# Patient Record
Sex: MALE | Race: ASIAN | Employment: FULL TIME | URBAN - METROPOLITAN AREA
[De-identification: names, ages, dates, MRNs, and addresses within clinical notes are randomized per-mention and may not be internally consistent; named-entity substitution may affect disease eponyms.]

---

## 2017-08-15 ENCOUNTER — OFFICE VISIT (OUTPATIENT)
Dept: FAMILY MEDICINE | Facility: CLINIC | Age: 47
End: 2017-08-15
Payer: COMMERCIAL

## 2017-08-15 VITALS
RESPIRATION RATE: 20 BRPM | SYSTOLIC BLOOD PRESSURE: 130 MMHG | TEMPERATURE: 98 F | HEIGHT: 65 IN | BODY MASS INDEX: 32.34 KG/M2 | DIASTOLIC BLOOD PRESSURE: 88 MMHG | HEART RATE: 80 BPM | WEIGHT: 194.1 LBS

## 2017-08-15 DIAGNOSIS — I10 BENIGN ESSENTIAL HTN: ICD-10-CM

## 2017-08-15 DIAGNOSIS — R68.82 LOW LIBIDO: ICD-10-CM

## 2017-08-15 DIAGNOSIS — E78.5 HYPERLIPIDEMIA WITH TARGET LDL LESS THAN 100: ICD-10-CM

## 2017-08-15 DIAGNOSIS — E11.9 TYPE 2 DIABETES MELLITUS WITHOUT COMPLICATION, WITHOUT LONG-TERM CURRENT USE OF INSULIN (H): Primary | ICD-10-CM

## 2017-08-15 DIAGNOSIS — N52.9 VASCULOGENIC ERECTILE DYSFUNCTION, UNSPECIFIED VASCULOGENIC ERECTILE DYSFUNCTION TYPE: ICD-10-CM

## 2017-08-15 LAB
ALBUMIN SERPL-MCNC: 4.1 G/DL (ref 3.4–5)
ALP SERPL-CCNC: 90 U/L (ref 40–150)
ALT SERPL W P-5'-P-CCNC: 43 U/L (ref 0–70)
ANION GAP SERPL CALCULATED.3IONS-SCNC: 9 MMOL/L (ref 3–14)
AST SERPL W P-5'-P-CCNC: 24 U/L (ref 0–45)
BILIRUB SERPL-MCNC: 0.4 MG/DL (ref 0.2–1.3)
BUN SERPL-MCNC: 10 MG/DL (ref 7–30)
CALCIUM SERPL-MCNC: 8.9 MG/DL (ref 8.5–10.1)
CHLORIDE SERPL-SCNC: 103 MMOL/L (ref 94–109)
CHOLEST SERPL-MCNC: 95 MG/DL
CO2 SERPL-SCNC: 27 MMOL/L (ref 20–32)
CREAT SERPL-MCNC: 0.98 MG/DL (ref 0.66–1.25)
GFR SERPL CREATININE-BSD FRML MDRD: 82 ML/MIN/1.7M2
GLUCOSE SERPL-MCNC: 80 MG/DL (ref 70–99)
HBA1C MFR BLD: 6.3 % (ref 4.3–6)
HDLC SERPL-MCNC: 27 MG/DL
LDLC SERPL CALC-MCNC: 11 MG/DL
NONHDLC SERPL-MCNC: 68 MG/DL
POTASSIUM SERPL-SCNC: 4.3 MMOL/L (ref 3.4–5.3)
PROT SERPL-MCNC: 7.6 G/DL (ref 6.8–8.8)
SODIUM SERPL-SCNC: 139 MMOL/L (ref 133–144)
TRIGL SERPL-MCNC: 284 MG/DL
TSH SERPL DL<=0.005 MIU/L-ACNC: 1.95 MU/L (ref 0.4–4)

## 2017-08-15 PROCEDURE — 99396 PREV VISIT EST AGE 40-64: CPT | Performed by: FAMILY MEDICINE

## 2017-08-15 PROCEDURE — 36415 COLL VENOUS BLD VENIPUNCTURE: CPT | Performed by: FAMILY MEDICINE

## 2017-08-15 PROCEDURE — 99207 C FOOT EXAM  NO CHARGE: CPT | Mod: 25 | Performed by: FAMILY MEDICINE

## 2017-08-15 PROCEDURE — 84403 ASSAY OF TOTAL TESTOSTERONE: CPT | Performed by: FAMILY MEDICINE

## 2017-08-15 PROCEDURE — 82043 UR ALBUMIN QUANTITATIVE: CPT | Performed by: FAMILY MEDICINE

## 2017-08-15 PROCEDURE — 80053 COMPREHEN METABOLIC PANEL: CPT | Performed by: FAMILY MEDICINE

## 2017-08-15 PROCEDURE — 84270 ASSAY OF SEX HORMONE GLOBUL: CPT | Performed by: FAMILY MEDICINE

## 2017-08-15 PROCEDURE — 83036 HEMOGLOBIN GLYCOSYLATED A1C: CPT | Performed by: FAMILY MEDICINE

## 2017-08-15 PROCEDURE — 84443 ASSAY THYROID STIM HORMONE: CPT | Performed by: FAMILY MEDICINE

## 2017-08-15 PROCEDURE — 80061 LIPID PANEL: CPT | Performed by: FAMILY MEDICINE

## 2017-08-15 RX ORDER — SILDENAFIL 100 MG/1
50-100 TABLET, FILM COATED ORAL DAILY PRN
Qty: 12 TABLET | Refills: 11 | Status: SHIPPED | OUTPATIENT
Start: 2017-08-15

## 2017-08-15 NOTE — MR AVS SNAPSHOT
After Visit Summary   8/15/2017    Damien Disla    MRN: [id number]           Patient Information     Date Of Birth          1970        Visit Information        Provider Department      8/15/2017 1:00 PM Efren Fuller MD [facility]        Today's Diagnoses     Type 2 diabetes mellitus without complication, without long-term current use of insulin (H)    -  1    Hyperlipidemia with target LDL less than 100        Benign essential HTN        Low libido        Vasculogenic erectile dysfunction, unspecified vasculogenic erectile dysfunction type          Care Instructions      Preventive Health Recommendations  Male Ages 40 to 49    Yearly exam:             See your health care provider every year in order to  o   Review health changes.   o   Discuss preventive care.    o   Review your medicines if your doctor has prescribed any.    You should be tested each year for STDs (sexually transmitted diseases) if you re at risk.     Have a cholesterol test every 5 years.     Have a colonoscopy (test for colon cancer) if someone in your family has had colon cancer or polyps before age 50.     After age 45, have a diabetes test (fasting glucose). If you are at risk for diabetes, you should have this test every 3 years.      Talk with your health care provider about whether or not a prostate cancer screening test (PSA) is right for you.    Shots: Get a flu shot each year. Get a tetanus shot every 10 years.     Nutrition:    Eat at least 5 servings of fruits and vegetables daily.     Eat whole-grain bread, whole-wheat pasta and brown rice instead of white grains and rice.     Talk to your provider about Calcium and Vitamin D.     Lifestyle    Exercise for at least 150 minutes a week (30 minutes a day, 5 days a week). This will help you control your weight and prevent disease.     Limit alcohol to one drink per day.     No smoking.     Wear sunscreen to prevent skin cancer.     See your  "dentist every six months for an exam and cleaning.              Follow-ups after your visit        Who to contact     If you have questions or need follow up information about today's clinic visit or your schedule please contact [facility] directly at [phone].  Normal or non-critical lab and imaging results will be communicated to you by MyChart, letter or phone within 4 business days after the clinic has received the results. If you do not hear from us within 7 days, please contact the clinic through [employer] or phone. If you have a critical or abnormal lab result, we will notify you by phone as soon as possible.  Submit refill requests through [employer] or call your pharmacy and they will forward the refill request to us. Please allow 3 business days for your refill to be completed.          Additional Information About Your Visit        MyChart Information     [employer] gives you secure access to your electronic health record. If you see a primary care provider, you can also send messages to your care team and make appointments. If you have questions, please call your primary care clinic.  If you do not have a primary care provider, please call [phone] and they will assist you.        Care EveryWhere ID     This is your Care EveryWhere ID. This could be used by other organizations to access your [facility] medical records  [id number]        Your Vitals Were     Pulse Temperature Respirations Height BMI (Body Mass Index)       80 98  F (36.7  C) (Oral) 20 5' 5\" (1.651 m) 32.3 kg/m2        Blood Pressure from Last 3 Encounters:   08/15/17 130/88   03/14/16 118/72   12/11/15 122/72    Weight from Last 3 Encounters:   08/15/17 194 lb 1.6 oz (88 kg)   03/14/16 165 lb (74.8 kg)   12/11/15 172 lb (78 kg)              We Performed the Following     Albumin Random Urine Quantitative     Comprehensive metabolic panel     FOOT EXAM     Hemoglobin A1c     Lipid panel reflex to direct LDL     " Testosterone Free and Total     TSH with free T4 reflex          Today's Medication Changes          These changes are accurate as of: 8/15/17  2:02 PM.  If you have any questions, ask your nurse or doctor.               Start taking these medicines.        Dose/Directions    sildenafil 100 MG cap/tab   Commonly known as:  REVATIO/VIAGRA   Used for:  Vasculogenic erectile dysfunction, unspecified vasculogenic erectile dysfunction type   Started by:  Efren Fuller MD        Dose:   mg   Take 0.5-1 tablets ( mg) by mouth daily as needed for erectile dysfunction Take 30 min to 4 hours before intercourse.  Never use with nitroglycerin, terazosin or doxazosin.   Quantity:  12 tablet   Refills:  11            Where to get your medicines      Some of these will need a paper prescription and others can be bought over the counter.  Ask your nurse if you have questions.     Bring a paper prescription for each of these medications     sildenafil 100 MG cap/tab                Primary Care Provider Office Phone # Fax #    Efren Fuller [phone]       [address] [facility] [address]        Equal Access to Services     [facility]: Hadii aad ku hadasho Soomaali, waaxda luqadaha, qaybta kaalmada adeegyada, waxay manuel haysuri hill . So [facility] [phone].    ATENCIÓN: Si habla español, tiene a valencia disposición servicios gratuitos de asistencia lingüística. Llame al [phone].    We comply with applicable federal civil rights laws and [place] laws. We do not discriminate on the basis of race, color, national origin, age, disability sex, sexual orientation or gender identity.            Thank you!     Thank you for choosing [facility]  for your care. Our goal is always to provide you with excellent care. Hearing back from our patients is one way we can continue to improve our services. Please take a few minutes to complete the written  survey that you may receive in the mail after your visit with us. Thank you!             Your Updated Medication List - Protect others around you: Learn how to safely use, store and throw away your medicines at www.disposemymeds.org.          This list is accurate as of: 8/15/17  2:02 PM.  Always use your most recent med list.                   Brand Name Dispense Instructions for use Diagnosis    ASPIRIN PO      Take 81 mg by mouth        losartan 25 MG tablet    COZAAR    90 tablet    Take 1 tablet (25 mg) by mouth daily Order ref# [id number]    Essential hypertension       * metFORMIN 500 MG 24 hr tablet    GLUCOPHAGE-XR    90 tablet    Take 2 tablets (1,000 mg) by mouth daily (with dinner)    Diabetes mellitus type 2, uncomplicated (H)       * metFORMIN 500 MG tablet    GLUCOPHAGE    180 tablet    Take 1 tablet (500 mg) by mouth 2 times daily (with meals)    Diabetes mellitus type 2, uncomplicated (H)       sildenafil 100 MG cap/tab    REVATIO/VIAGRA    12 tablet    Take 0.5-1 tablets ( mg) by mouth daily as needed for erectile dysfunction Take 30 min to 4 hours before intercourse.  Never use with nitroglycerin, terazosin or doxazosin.    Vasculogenic erectile dysfunction, unspecified vasculogenic erectile dysfunction type       simvastatin 40 MG tablet    ZOCOR    90 tablet    Take 1 tablet (40 mg) by mouth At Bedtime Order ref# [id number]    Hyperlipidemia LDL goal <130       VITAMIN D3 PO      Take 1,000 Units by mouth daily        * Notice:  This list has 2 medication(s) that are the same as other medications prescribed for you. Read the directions carefully, and ask your doctor or other care provider to review them with you.

## 2017-08-15 NOTE — PROGRESS NOTES
SUBJECTIVE:   CC: Damien Disla is an 46 year old male who presents for preventative health visit.     Physical   Annual:     Getting at least 3 servings of Calcium per day::  Yes    Bi-annual eye exam::  Yes    Dental care twice a year::  NO    Sleep apnea or symptoms of sleep apnea::  None    Diet::  Low salt    Frequency of exercise::  None    Taking medications regularly::  Yes    Medication side effects::  None    Additional concerns today::  YES      Answers for HPI/ROS submitted by the patient on 8/15/2017   PHQ-2 Score: 0      Generally feeling well, no specific concerns.  Starting to try and have another child.  Having some Ed issues.  No significant morning erection.  Family life is complicated - mom is critically ill and living with them.  Moved away and back within the last year and half.   Damien admits he feels quite stressed.  Also feeling some pressure to have another child as he and his wife are aging.    Is not currently fasting.    Today's PHQ-2 Score: PHQ-2 ( 1999 Pfizer) 3/14/2016   Q1: Little interest or pleasure in doing things 0   Q2: Feeling down, depressed or hopeless 0   PHQ-2 Score 0       Social History   Substance Use Topics     Smoking status: Former Smoker     Packs/day: 1.00     Years: 18.00     Types: Cigarettes     Quit date: 1/1/2004     Smokeless tobacco: Never Used     Alcohol use No     The patient does not drink >3 drinks per day nor >7 drinks per week.    Last PSA: No results found for: PSA    Reviewed orders with patient. Reviewed health maintenance and updated orders accordingly - Yes      Reviewed and updated as needed this visit by clinical staff         Reviewed and updated as needed this visit by Provider              ROS:  C: NEGATIVE for fever, chills, change in weight  I: NEGATIVE for worrisome rashes, moles or lesions  E: NEGATIVE for vision changes or irritation  ENT: NEGATIVE for ear, mouth and throat problems  R: NEGATIVE for significant cough or SOB  CV: NEGATIVE  for chest pain, palpitations or peripheral edema  GI: NEGATIVE for nausea, abdominal pain, heartburn, or change in bowel habits   male: negative for dysuria, hematuria, decreased urinary stream, erectile dysfunction, urethral discharge  M: NEGATIVE for significant arthralgias or myalgia  N: NEGATIVE for weakness, dizziness or paresthesias  P: NEGATIVE for changes in mood or affect    OBJECTIVE:   There were no vitals taken for this visit.    EXAM:  GENERAL: healthy, alert and no distress  EYES: Eyes grossly normal to inspection, PERRL and conjunctivae and sclerae normal  HENT: ear canals and TM's normal, nose and mouth without ulcers or lesions  NECK: no adenopathy, no asymmetry, masses, or scars and thyroid normal to palpation  RESP: lungs clear to auscultation - no rales, rhonchi or wheezes  CV: regular rate and rhythm, normal S1 S2, no S3 or S4, no murmur, click or rub, no peripheral edema and peripheral pulses strong  ABDOMEN: soft, nontender, no hepatosplenomegaly, no masses and bowel sounds normal   (male): normal male genitalia without lesions or urethral discharge, no hernia  MS: no gross musculoskeletal defects noted, no edema  SKIN: no suspicious lesions or rashes.  No callus or onychomycosis on feet.  NEURO: Normal strength and tone, mentation intact and speech normal  PSYCH: mentation appears normal, affect normal/bright    ASSESSMENT/PLAN:       ICD-10-CM    1. Type 2 diabetes mellitus without complication, without long-term current use of insulin (H) E11.9 Hemoglobin A1c     FOOT EXAM     Comprehensive metabolic panel     TSH with free T4 reflex     Albumin Random Urine Quantitative     CANCELED: Hemoglobin A1c   2. Hyperlipidemia with target LDL less than 100 E78.5 Lipid panel reflex to direct LDL   3. Benign essential HTN I10 Comprehensive metabolic panel     Albumin Random Urine Quantitative   4. Low libido R68.82 Testosterone Free and Total   5. Vasculogenic erectile dysfunction, unspecified  "vasculogenic erectile dysfunction type N52.9        COUNSELING:   Reviewed preventive health counseling, as reflected in patient instructions       Regular exercise       Vision screening       Family planning         reports that he quit smoking about 13 years ago. His smoking use included Cigarettes. He has a 18.00 pack-year smoking history. He has never used smokeless tobacco.      Estimated body mass index is 27.46 kg/(m^2) as calculated from the following:    Height as of 3/14/16: 5' 5\" (1.651 m).    Weight as of 3/14/16: 165 lb (74.8 kg).   Weight management plan: Discussed healthy diet and exercise guidelines and patient will follow up in 6 months in clinic to re-evaluate.    Counseling Resources:  ATP IV Guidelines  Pooled Cohorts Equation Calculator  FRAX Risk Assessment  ICSI Preventive Guidelines  Dietary Guidelines for Americans, 2010  USDA's MyPlate  ASA Prophylaxis  Lung CA Screening    Efren Fuller MD  [facility]    "

## 2017-08-15 NOTE — NURSING NOTE
"Chief Complaint   Patient presents with     Physical     Consult     reproduction issue       Initial /88  Pulse 80  Temp 98  F (36.7  C) (Oral)  Resp 20  Ht 5' 5\" (1.651 m)  Wt 194 lb 1.6 oz (88 kg)  BMI 32.3 kg/m2 Estimated body mass index is 32.3 kg/(m^2) as calculated from the following:    Height as of this encounter: 5' 5\" (1.651 m).    Weight as of this encounter: 194 lb 1.6 oz (88 kg).  Medication Reconciliation: complete   Bridgette Barkley CMA (AAMA)        "

## 2017-08-16 LAB
CREAT UR-MCNC: 52 MG/DL
MICROALBUMIN UR-MCNC: <5 MG/L
MICROALBUMIN/CREAT UR: NORMAL MG/G CR (ref 0–17)

## 2017-08-21 LAB
SHBG SERPL-SCNC: 8 NMOL/L (ref 11–80)
TESTOST FREE SERPL-MCNC: 6.82 NG/DL (ref 4.7–24.4)
TESTOST SERPL-MCNC: 204 NG/DL (ref 240–950)

## 2017-08-28 ENCOUNTER — VIRTUAL VISIT (OUTPATIENT)
Dept: FAMILY MEDICINE | Facility: CLINIC | Age: 47
End: 2017-08-28
Payer: COMMERCIAL

## 2017-08-28 DIAGNOSIS — R79.89 LOW TESTOSTERONE IN MALE: Primary | ICD-10-CM

## 2017-08-28 PROCEDURE — 99442 ZZC PHYSICIAN TELEPHONE EVALUATION 11-20 MIN: CPT | Performed by: FAMILY MEDICINE

## 2017-08-28 RX ORDER — TESTOSTERONE GEL, 1% 10 MG/G
4 GEL TRANSDERMAL DAILY
Qty: 150 G | Refills: 1 | Status: SHIPPED | OUTPATIENT
Start: 2017-08-28 | End: 2017-09-18

## 2017-08-29 ENCOUNTER — TELEPHONE (OUTPATIENT)
Dept: FAMILY MEDICINE | Facility: CLINIC | Age: 47
End: 2017-08-29

## 2017-08-29 DIAGNOSIS — R79.89 LOW TESTOSTERONE: Primary | ICD-10-CM

## 2017-08-29 DIAGNOSIS — R79.89 LOW TESTOSTERONE IN MALE: ICD-10-CM

## 2017-08-29 NOTE — TELEPHONE ENCOUNTER
Message from Pharm- Androgel 1% gel pump is Discontinued.  Lower Cost Alternatives: Androderm or Testim.    Both alternatives may also need a PA.    Sent to MD for new RX.       CHARLIE Muhammad  August 29, 2017  2:32 PM    BIN:[id number]  PCN: [id number] ID:[id number] [facility]

## 2017-09-05 ENCOUNTER — TELEPHONE (OUTPATIENT)
Dept: FAMILY MEDICINE | Facility: CLINIC | Age: 47
End: 2017-09-05

## 2017-09-05 DIAGNOSIS — R79.89 LOW TESTOSTERONE: ICD-10-CM

## 2017-09-05 NOTE — TELEPHONE ENCOUNTER
Pt calls to check status testosterone PA . Informed pump not covered so SB sent patches 8/29/17. Pt states not at pharmacy or there is PA issue. [facility].   We called, pharmacist informs all testosterone  no matter how applied requires a PA.   They are faxing PA info to FM today.   Please watch fax.   Pt requests ASAP (incoming caller ID from [facility])   Do we want to verify with Dr. Fuller which is preferred from MD perspective, pump or patch?  Souleymane Means, RN

## 2017-09-07 ENCOUNTER — MYC MEDICAL ADVICE (OUTPATIENT)
Dept: FAMILY MEDICINE | Facility: CLINIC | Age: 47
End: 2017-09-07

## 2017-09-07 NOTE — TELEPHONE ENCOUNTER
Patient calling to check on PA.  Advised it was called to insurance yesterday and can take up to a week for response.  Laly Valerio RN

## 2017-09-08 NOTE — TELEPHONE ENCOUNTER
Faxed form to [phone], then forms were sent to [employer] and a temporary copy is kept in a folder at the [facility] nurses station.      Ab Bueno   09/08/17 10:16 AM

## 2017-09-14 NOTE — TELEPHONE ENCOUNTER
FINALLY----Got prior auth APPROVED over the phone by calling [phone].  30 patches for 30 days.  Case #[id number].    I called the pharmacy to have them run the rx and it is still coming through as needing a prior auth.  I called the prior auth department back again and talked with someone.  After spending WAY too much time on the phone with this prior auth, I advised the pharmacist to call the Help desk at [phone]. NDC: [phone].  She will contact them and if they have any further problems they will call me.    Yumiko Apodaca RN    PT ID #[id number]

## 2017-09-14 NOTE — TELEPHONE ENCOUNTER
I've been now working on getting this prior auth done for over an hour and am still on the phone trying to work it out.  Will notify patient and pharmacy status before the end of the day.

## 2017-09-14 NOTE — TELEPHONE ENCOUNTER
Pt calling a upset that he hasn't gotten anything from the PA for testosterone.  He demands that this be taken care of now and that clinic calls the insurance NOW to check status AND to call him with the number for him to call that was given on the form (writer at  informed pt of process and not able to get number off form since physically not there.)  Pt was extremely rude and wants this taken care of now    Mattie Patel RN, BSN

## 2017-09-15 NOTE — TELEPHONE ENCOUNTER
So, after spending another 1/2 hour dealing with the pharmacy trying to get the prior auth to go through, they are now telling me that because of the way the drug comes packaged, we now need to get a NEW prior auth for 60 for 30 days since there are 60 per package.  Not sure why they cannot just break up the package, but after spending most of my afternoon on this, I did not ask.    I called the prior auth department back again and they stated that we cannot get another prior auth for 60 for 30 days just because of the packaging.  We can only do this if the provider wants the patient to have 60 for 30 days.    Not sure where to go from here now????    Yumiko Apodaca, RN

## 2017-09-18 NOTE — TELEPHONE ENCOUNTER
"New Rx for 60 patches - this is a 60 day supply and intended as such.  Box size if 60, so this should avoid \"breaking up\" a package.    Efren Fuller MD    "

## 2017-09-26 DIAGNOSIS — R79.89 LOW TESTOSTERONE: ICD-10-CM

## 2017-09-26 NOTE — TELEPHONE ENCOUNTER
Pt calling stating that he is in [place] for the next 2 months and needs his androgel script faxed to a pharmacy there.  He states he never filled it at the [facility] in [place] and had all his other scripts pulled through the [employer] system.  Pt aware PCP is out of the office today    Please advise    Mattie Patel RN, BSN

## 2017-09-27 NOTE — TELEPHONE ENCOUNTER
Rx was faxed to [facility], pharmacy will notify patient when ready to be picked up.     Ab Bueno   09/27/17 11:20 AM

## 2017-10-10 ENCOUNTER — TELEPHONE (OUTPATIENT)
Dept: NURSING | Facility: CLINIC | Age: 47
End: 2017-10-10

## 2017-10-10 NOTE — TELEPHONE ENCOUNTER
"Pt calling very frustrated that RX is not being filled in [place].  Per 9/5/17 note on PA:    Note      So, after spending another 1/2 hour dealing with the pharmacy trying to get the prior auth to go through, they are now telling me that because of the way the drug comes packaged, we now need to get a NEW prior auth for 60 for 30 days since there are 60 per package.  Not sure why they cannot just break up the package, but after spending most of my afternoon on this, I did not ask.     I called the prior auth department back again and they stated that we cannot get another prior auth for 60 for 30 days just because of the packaging.  We can only do this if the provider wants the patient to have 60 for 30 days.     Not sure where to go from here now????     FARHAD Steiner Scott Patrick, MD        10:56 AM   Note      New Rx for 60 patches - this is a 60 day supply and intended as such.  Box size if 60, so this should avoid \"breaking up\" a package.     Efren Fuller MD           Pt insisted on conferencing in the PA for [employer] so that this gets fixed today.  Pt called the PA line and started yelling at the representative from the PA department and calling her \"stupid\" because she had to transfer him to another PA specialist who has the access to the restricted account.  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  Pt's insurance and pharmacy were speaking to each other while patient, RN and another pharmacy tech were on the phone.  Pt states he will call back later to the clinic.    Mattie Patel RN, BSN    "

## 2017-10-30 DIAGNOSIS — E78.5 HYPERLIPIDEMIA LDL GOAL <130: ICD-10-CM

## 2017-10-31 RX ORDER — SIMVASTATIN 40 MG
40 TABLET ORAL AT BEDTIME
Qty: 90 TABLET | Refills: 2 | Status: SHIPPED | OUTPATIENT
Start: 2017-10-31

## 2017-11-09 DIAGNOSIS — E11.9 TYPE 2 DIABETES MELLITUS WITHOUT COMPLICATION, WITHOUT LONG-TERM CURRENT USE OF INSULIN (H): Primary | ICD-10-CM

## 2017-11-09 NOTE — TELEPHONE ENCOUNTER
Metformin-pt is out needs today please     Last Written Prescription Date: 07/08/17  Last Fill Quantity: 180, # refills: 0  Last Office Visit with G, P or  Health prescribing provider:  08/15/17 w/Dr Fuller        BP Readings from Last 3 Encounters:   08/15/17 130/88   03/14/16 118/72   12/11/15 122/72     Lab Results   Component Value Date    MICROL <5 08/15/2017     Lab Results   Component Value Date    UMALCR Unable to calculate due to low value 08/15/2017     Creatinine   Date Value Ref Range Status   08/15/2017 0.98 0.66 - 1.25 mg/dL Final   ]  GFR Estimate   Date Value Ref Range Status   08/15/2017 82 >60 mL/min/1.7m2 Final     Comment:     Non  GFR Calc   03/14/2016 83 >60 mL/min/1.7m2 Final     Comment:     Non  GFR Calc   03/06/2015 88 >60 mL/min/1.7m2 Final     Comment:     Non  GFR Calc     GFR Estimate If Black   Date Value Ref Range Status   08/15/2017 >90 >60 mL/min/1.7m2 Final     Comment:      GFR Calc   03/14/2016 >90   GFR Calc   >60 mL/min/1.7m2 Final   03/06/2015 >90   GFR Calc   >60 mL/min/1.7m2 Final     Lab Results   Component Value Date    CHOL 95 08/15/2017     Lab Results   Component Value Date    HDL 27 08/15/2017     Lab Results   Component Value Date    LDL 11 08/15/2017     Lab Results   Component Value Date    TRIG 284 08/15/2017     Lab Results   Component Value Date    CHOLHDLRATIO 3.1 03/06/2015     Lab Results   Component Value Date    AST 24 08/15/2017     Lab Results   Component Value Date    ALT 43 08/15/2017     Lab Results   Component Value Date    A1C 6.3 08/15/2017    A1C 5.6 03/14/2016    A1C 5.9 09/29/2015    A1C 5.9 12/05/2014    A1C 6.1 07/28/2014     Potassium   Date Value Ref Range Status   08/15/2017 4.3 3.4 - 5.3 mmol/L Final

## 2017-11-09 NOTE — TELEPHONE ENCOUNTER
Prescription approved per [facility] Refill Protocol.    Anika PITTS RN, BSN, PHN  [facility] RN

## 2017-11-28 ENCOUNTER — TELEPHONE (OUTPATIENT)
Dept: FAMILY MEDICINE | Facility: CLINIC | Age: 47
End: 2017-11-28

## 2017-11-28 DIAGNOSIS — I10 ESSENTIAL HYPERTENSION: ICD-10-CM

## 2017-11-28 RX ORDER — LOSARTAN POTASSIUM 25 MG/1
25 TABLET ORAL DAILY
Qty: 90 TABLET | Refills: 0 | Status: SHIPPED | OUTPATIENT
Start: 2017-11-28 | End: 2018-02-26

## 2017-11-28 NOTE — TELEPHONE ENCOUNTER
LOSARTAN      Last Written Prescription Date: 03/03/16  Last Fill Quantity: 90, # refills: 0  Last Office Visit with [facility], [facility] or [facility] prescribing provider: 08/15/17       Potassium   Date Value Ref Range Status   08/15/2017 4.3 3.4 - 5.3 mmol/L Final     Creatinine   Date Value Ref Range Status   08/15/2017 0.98 0.66 - 1.25 mg/dL Final     BP Readings from Last 3 Encounters:   08/15/17 130/88   03/14/16 118/72   12/11/15 122/72       Patient requests this to [facility] in [place].

## 2018-02-21 NOTE — MR AVS SNAPSHOT
After Visit Summary   8/28/2017    Damien Disla    MRN: [id number]           Patient Information     Date Of Birth          1970        Visit Information        Provider Department      8/28/2017 10:40 AM Efren Fuller MD [facility]        Today's Diagnoses     Low testosterone in male    -  1       Follow-ups after your visit        Who to contact     If you have questions or need follow up information about today's clinic visit or your schedule please contact [facility] directly at [phone].  Normal or non-critical lab and imaging results will be communicated to you by MyChart, letter or phone within 4 business days after the clinic has received the results. If you do not hear from us within 7 days, please contact the clinic through LX[facility] or phone. If you have a critical or abnormal lab result, we will notify you by phone as soon as possible.  Submit refill requests through [employer] or call your pharmacy and they will forward the refill request to us. Please allow 3 business days for your refill to be completed.          Additional Information About Your Visit        MyChart Information     [employer] gives you secure access to your electronic health record. If you see a primary care provider, you can also send messages to your care team and make appointments. If you have questions, please call your primary care clinic.  If you do not have a primary care provider, please call [phone] and they will assist you.        Care EveryWhere ID     This is your Care EveryWhere ID. This could be used by other organizations to access your [facility] medical records  [id number]         Blood Pressure from Last 3 Encounters:   08/15/17 130/88   03/14/16 118/72   12/11/15 122/72    Weight from Last 3 Encounters:   08/15/17 194 lb 1.6 oz (88 kg)   03/14/16 165 lb (74.8 kg)   12/11/15 172 lb (78 kg)              Today, you had the following     No orders found  Yeah if she want to be seen today Im ok , if she like to see alyssa please add her to her schedule for tomorrow    for display         Today's Medication Changes          These changes are accurate as of: 8/28/17 11:00 AM.  If you have any questions, ask your nurse or doctor.               Start taking these medicines.        Dose/Directions    testosterone 12.5 MG/ACT (1%) gel   Commonly known as:  ANDROGEL 1% PUMP   Used for:  Low testosterone in male   Started by:  Efren Fuller MD        Dose:  4 pump   Place 4 pumps (50 mg) onto the skin daily Apply from dispenser to clean, dry, intact skin of the shoulders, upper arms, or abdomen.   Quantity:  150 g   Refills:  1            Where to get your medicines      Some of these will need a paper prescription and others can be bought over the counter.  Ask your nurse if you have questions.     Bring a paper prescription for each of these medications     testosterone 12.5 MG/ACT (1%) gel                Primary Care Provider Office Phone # Fax #    Efren Fuller [phone]  [address] [facility] [place] [address]        Equal Access to Services     [facility] AH: Hadii aad ku hadasho Soomaali, waaxda luqadaha, qaybta kaalmada adeegyada, waxay idiin hayvaibhavn mar[place] . So [facility] [phone].    ATENCIÓN: Si habla español, tiene a valencia disposición servicios gratuitos de asistencia lingüística. Llame al [phone].    We comply with applicable federal civil rights laws and [place] laws. We do not discriminate on the basis of race, color, national origin, age, disability sex, sexual orientation or gender identity.            Thank you!     Thank you for choosing [facility]  for your care. Our goal is always to provide you with excellent care. Hearing back from our patients is one way we can continue to improve our services. Please take a few minutes to complete the written survey that you may receive in the mail after your visit with us. Thank you!             Your Updated Medication List - Protect others around you:  Learn how to safely use, store and throw away your medicines at www.disposemymeds.org.          This list is accurate as of: 8/28/17 11:00 AM.  Always use your most recent med list.                   Brand Name Dispense Instructions for use Diagnosis    ASPIRIN PO      Take 81 mg by mouth        losartan 25 MG tablet    COZAAR    90 tablet    Take 1 tablet (25 mg) by mouth daily Order ref# [id number]    Essential hypertension       * metFORMIN 500 MG 24 hr tablet    GLUCOPHAGE-XR    90 tablet    Take 2 tablets (1,000 mg) by mouth daily (with dinner)    Diabetes mellitus type 2, uncomplicated (H)       * metFORMIN 500 MG tablet    GLUCOPHAGE    180 tablet    Take 1 tablet (500 mg) by mouth 2 times daily (with meals)    Diabetes mellitus type 2, uncomplicated (H)       sildenafil 100 MG cap/tab    REVATIO/VIAGRA    12 tablet    Take 0.5-1 tablets ( mg) by mouth daily as needed for erectile dysfunction Take 30 min to 4 hours before intercourse.  Never use with nitroglycerin, terazosin or doxazosin.    Vasculogenic erectile dysfunction, unspecified vasculogenic erectile dysfunction type       simvastatin 40 MG tablet    ZOCOR    90 tablet    Take 1 tablet (40 mg) by mouth At Bedtime Order ref# [id number]    Hyperlipidemia LDL goal <130       testosterone 12.5 MG/ACT (1%) gel    ANDROGEL 1% PUMP    150 g    Place 4 pumps (50 mg) onto the skin daily Apply from dispenser to clean, dry, intact skin of the shoulders, upper arms, or abdomen.    Low testosterone in male       VITAMIN D3 PO      Take 1,000 Units by mouth daily        * Notice:  This list has 2 medication(s) that are the same as other medications prescribed for you. Read the directions carefully, and ask your doctor or other care provider to review them with you.

## 2018-02-27 DIAGNOSIS — E11.9 TYPE 2 DIABETES MELLITUS WITHOUT COMPLICATION, WITHOUT LONG-TERM CURRENT USE OF INSULIN (H): ICD-10-CM

## 2018-02-28 NOTE — TELEPHONE ENCOUNTER
PCP:    Pt called in requesting 1 more, 1 month refill. He states he is in the process of establishing his insurance and will have an appointment schedule within the next 30 days.   Advised Pt that last refill was partial and he was advised to schedule something before pills run out. Pt requesting 1 more, 1 times refill which I have pended if you agree.     Didi Weeks RN -- [facility]

## 2018-02-28 NOTE — TELEPHONE ENCOUNTER
"Pharm is requesting a 90 day supply.    Requested Prescriptions   Pending Prescriptions Disp Refills     metFORMIN (GLUCOPHAGE) 500 MG tablet [Pharmacy Med Name: METFORMIN  MG TABLET] 180 tablet 0    Last Written Prescription Date:  2/6/18  Last Fill Quantity: 40,  # refills: 0   Last Office Visit: 8/28/2017   Future Office Visit:      Sig: TAKE 1 TABLET (500 MG) BY MOUTH 2 TIMES DAILY (WITH MEALS)    Biguanide Agents Failed    2/27/2018  5:28 PM       Failed - Patient has documented A1c within the specified period of time.    Recent Labs   Lab Test  08/15/17   1336   A1C  6.3*            Failed - Recent (6 mos) or future visit with authorizing provider's specialty    Patient had office visit in the last 6 months or has a visit in the next 30 days with authorizing provider.  See \"Patient Info\" tab in inbasket, or \"Choose Columns\" in Meds & Orders section of the refill encounter.           Passed - Blood pressure less than 140/90 in past 6 months    BP Readings from Last 3 Encounters:   08/15/17 130/88   03/14/16 118/72   12/11/15 122/72          Passed - Patient has documented LDL within the past 12 mos.    Recent Labs   Lab Test  08/15/17   1336   LDL  11          Passed - Patient has had a Microalbumin in the past 12 mos.    Recent Labs   Lab Test  08/15/17   1402   MICROL  <5   UMALCR  Unable to calculate due to low value          Passed - Patient is age 10 or older       Passed - Patient's CR is NOT>1.4 OR Patient's EGFR is NOT<45 within past 12 mos.    Recent Labs   Lab Test  08/15/17   1336   GFRESTIMATED  82   GFRESTBLACK  >90     Recent Labs   Lab Test  08/15/17   1336   CR  0.98          Passed - Patient does NOT have a diagnosis of CHF.          "

## 2018-02-28 NOTE — TELEPHONE ENCOUNTER
PCP only gave 10 day supply, was warned last month to make appt  Bev Sandhu RN, BS  Clinical Nurse Triage.

## 2018-03-03 DIAGNOSIS — E11.9 TYPE 2 DIABETES MELLITUS WITHOUT COMPLICATION, WITHOUT LONG-TERM CURRENT USE OF INSULIN (H): ICD-10-CM

## 2018-03-05 NOTE — TELEPHONE ENCOUNTER
Duplicate, needs to be seen  Pharmacy listed is in [place]    Bev Sandhu RN, BS  Clinical Nurse Triage.

## 2018-03-05 NOTE — TELEPHONE ENCOUNTER
"Requested Prescriptions   Pending Prescriptions Disp Refills     metFORMIN (GLUCOPHAGE) 500 MG tablet [Pharmacy Med Name: METFORMIN  MG TABLET]  This request may be a duplicate.    Last Written Prescription Date:  3/2/18  Last Fill Quantity: 60 tablet,  # refills: 0   Last Office Visit with G, P or [facility] prescribing provider:  8/15/17   Future Office Visit:      40 tablet 0     Sig: TAKE 1 TABLET (500 MG) BY MOUTH 2 TIMES DAILY (WITH MEALS) *PT NEED FOLLOW UP VISIT FOR REFILL*    Biguanide Agents Failed    3/3/2018 10:39 AM       Failed - Blood pressure less than 140/90 in past 6 months    BP Readings from Last 3 Encounters:   08/15/17 130/88   03/14/16 118/72   12/11/15 122/72          Failed - Patient has documented A1c within the specified period of time.    Recent Labs   Lab Test  08/15/17   1336   A1C  6.3*          Failed - Recent (6 mo) or future (30 days) visit within the authorizing provider's specialty    Patient had office visit in the last 6 months or has a visit in the next 30 days with authorizing provider.  See \"Patient Info\" tab in inbasket, or \"Choose Columns\" in Meds & Orders section of the refill encounter.           Passed - Patient has documented LDL within the past 12 mos.    Recent Labs   Lab Test  08/15/17   1336   LDL  11          Passed - Patient has had a Microalbumin in the past 12 mos.    Recent Labs   Lab Test  08/15/17   1402   MICROL  <5   UMALCR  Unable to calculate due to low value            Passed - Patient is age 10 or older       Passed - Patient's CR is NOT>1.4 OR Patient's EGFR is NOT<45 within past 12 mos.    Recent Labs   Lab Test  08/15/17   1336   GFRESTIMATED  82   GFRESTBLACK  >90       Recent Labs   Lab Test  08/15/17   1336   CR  0.98            Passed - Patient does NOT have a diagnosis of CHF.          "

## 2018-07-03 ENCOUNTER — TELEPHONE (OUTPATIENT)
Dept: FAMILY MEDICINE | Facility: CLINIC | Age: 48
End: 2018-07-03

## 2018-07-03 NOTE — TELEPHONE ENCOUNTER
Panel Management Review      Patient has the following on his problem list:     Diabetes    ASA: Passed    Last A1C  Lab Results   Component Value Date    A1C 6.3 08/15/2017    A1C 5.6 03/14/2016    A1C 5.9 09/29/2015    A1C 5.9 12/05/2014    A1C 6.1 07/28/2014     A1C tested: FAILED    Last LDL:    Lab Results   Component Value Date    CHOL 95 08/15/2017     Lab Results   Component Value Date    HDL 27 08/15/2017     Lab Results   Component Value Date    LDL 11 08/15/2017     Lab Results   Component Value Date    TRIG 284 08/15/2017     Lab Results   Component Value Date    CHOLHDLRATIO 3.1 03/06/2015     Lab Results   Component Value Date    NHDL 68 08/15/2017       Is the patient on a Statin? YES             Is the patient on Aspirin? YES    Medications     HMG CoA Reductase Inhibitors    simvastatin (ZOCOR) 40 MG tablet    Salicylates    ASPIRIN PO          Last three blood pressure readings:  BP Readings from Last 3 Encounters:   08/15/17 130/88   03/14/16 118/72   12/11/15 122/72       Date of last diabetes office visit: 8/15/17     Tobacco History:     History   Smoking Status     Former Smoker     Packs/day: 1.00     Years: 18.00     Types: Cigarettes     Quit date: 1/1/2004   Smokeless Tobacco     Never Used           IVD   ASA: Passed    Last LDL:    Lab Results   Component Value Date    CHOL 95 08/15/2017     Lab Results   Component Value Date    HDL 27 08/15/2017     Lab Results   Component Value Date    LDL 11 08/15/2017     Lab Results   Component Value Date    TRIG 284 08/15/2017        Lab Results   Component Value Date    CHOLHDLRATIO 3.1 03/06/2015        Is the patient on a Statin? YES   Is the patient on Aspirin? YES                  Medications     HMG CoA Reductase Inhibitors    simvastatin (ZOCOR) 40 MG tablet    Salicylates    ASPIRIN PO          Last three blood pressure readings:  BP Readings from Last 3 Encounters:   08/15/17 130/88   03/14/16 118/72   12/11/15 122/72        Tobacco  History:     History   Smoking Status     Former Smoker     Packs/day: 1.00     Years: 18.00     Types: Cigarettes     Quit date: 1/1/2004   Smokeless Tobacco     Never Used       Hypertension   Last three blood pressure readings:  BP Readings from Last 3 Encounters:   08/15/17 130/88   03/14/16 118/72   12/11/15 122/72     Blood pressure: Passed    HTN Guidelines:  Age 18-59 BP range:  Less than 140/90  Age 60-85 with Diabetes:  Less than 140/90  Age 60-85 without Diabetes:  less than 150/90      Composite cancer screening  Chart review shows that this patient is due/due soon for the following None  Summary:    Patient is due/failing the following:   A1C    Action needed:   Patient needs office visit for diabetes follow up and A1C.    Type of outreach:    Sent [employer] message.    Questions for provider review:    None                                                                                                                                    Bridgette Barkley CMA ([facility])

## 2020-03-02 ENCOUNTER — HEALTH MAINTENANCE LETTER (OUTPATIENT)
Age: 50
End: 2020-03-02

## 2020-12-20 ENCOUNTER — HEALTH MAINTENANCE LETTER (OUTPATIENT)
Age: 50
End: 2020-12-20

## 2021-04-18 ENCOUNTER — HEALTH MAINTENANCE LETTER (OUTPATIENT)
Age: 51
End: 2021-04-18

## 2021-08-08 ENCOUNTER — HEALTH MAINTENANCE LETTER (OUTPATIENT)
Age: 51
End: 2021-08-08

## 2021-10-03 ENCOUNTER — HEALTH MAINTENANCE LETTER (OUTPATIENT)
Age: 51
End: 2021-10-03

## 2022-03-20 ENCOUNTER — HEALTH MAINTENANCE LETTER (OUTPATIENT)
Age: 52
End: 2022-03-20

## 2022-05-15 ENCOUNTER — HEALTH MAINTENANCE LETTER (OUTPATIENT)
Age: 52
End: 2022-05-15

## 2022-09-10 ENCOUNTER — HEALTH MAINTENANCE LETTER (OUTPATIENT)
Age: 52
End: 2022-09-10

## 2023-01-21 ENCOUNTER — HEALTH MAINTENANCE LETTER (OUTPATIENT)
Age: 53
End: 2023-01-21

## 2023-06-03 ENCOUNTER — HEALTH MAINTENANCE LETTER (OUTPATIENT)
Age: 53
End: 2023-06-03

## 2023-07-23 ENCOUNTER — HEALTH MAINTENANCE LETTER (OUTPATIENT)
Age: 53
End: 2023-07-23

## 2024-02-18 ENCOUNTER — HEALTH MAINTENANCE LETTER (OUTPATIENT)
Age: 54
End: 2024-02-18